# Patient Record
Sex: FEMALE | Race: WHITE | NOT HISPANIC OR LATINO | URBAN - METROPOLITAN AREA
[De-identification: names, ages, dates, MRNs, and addresses within clinical notes are randomized per-mention and may not be internally consistent; named-entity substitution may affect disease eponyms.]

---

## 2023-09-08 ENCOUNTER — TELEPHONE ENCOUNTER (OUTPATIENT)
Dept: URBAN - METROPOLITAN AREA CLINIC 74 | Facility: CLINIC | Age: 33
End: 2023-09-08

## 2023-09-08 ENCOUNTER — LAB OUTSIDE AN ENCOUNTER (OUTPATIENT)
Dept: URBAN - METROPOLITAN AREA CLINIC 74 | Facility: CLINIC | Age: 33
End: 2023-09-08

## 2023-09-08 ENCOUNTER — OFFICE VISIT (OUTPATIENT)
Dept: URBAN - METROPOLITAN AREA CLINIC 74 | Facility: CLINIC | Age: 33
End: 2023-09-08
Payer: MEDICAID

## 2023-09-08 ENCOUNTER — WEB ENCOUNTER (OUTPATIENT)
Dept: URBAN - METROPOLITAN AREA CLINIC 74 | Facility: CLINIC | Age: 33
End: 2023-09-08

## 2023-09-08 VITALS
TEMPERATURE: 97.7 F | DIASTOLIC BLOOD PRESSURE: 70 MMHG | HEART RATE: 92 BPM | BODY MASS INDEX: 51.91 KG/M2 | WEIGHT: 293 LBS | HEIGHT: 63 IN | SYSTOLIC BLOOD PRESSURE: 118 MMHG

## 2023-09-08 DIAGNOSIS — R10.824 LEFT LOWER QUADRANT ABDOMINAL TENDERNESS WITH REBOUND TENDERNESS: ICD-10-CM

## 2023-09-08 DIAGNOSIS — R19.7 DIARRHEA OF PRESUMED INFECTIOUS ORIGIN: ICD-10-CM

## 2023-09-08 DIAGNOSIS — K31.A0 GASTRIC INTESTINAL METAPLASIA: ICD-10-CM

## 2023-09-08 DIAGNOSIS — R11.2 NAUSEA AND VOMITING IN ADULT: ICD-10-CM

## 2023-09-08 DIAGNOSIS — R10.32 LLQ ABDOMINAL PAIN: ICD-10-CM

## 2023-09-08 DIAGNOSIS — K29.30 CHRONIC SUPERFICIAL GASTRITIS WITHOUT BLEEDING: ICD-10-CM

## 2023-09-08 DIAGNOSIS — K57.90 DIVERTICULOSIS: ICD-10-CM

## 2023-09-08 PROBLEM — 397881000: Status: ACTIVE | Noted: 2023-09-08

## 2023-09-08 PROCEDURE — 99204 OFFICE O/P NEW MOD 45 MIN: CPT | Performed by: PHYSICIAN ASSISTANT

## 2023-09-08 RX ORDER — PANTOPRAZOLE SODIUM 40 MG/1
1 TABLET TABLET, DELAYED RELEASE ORAL ONCE A DAY
Qty: 30 | Refills: 3 | OUTPATIENT
Start: 2023-09-08

## 2023-09-08 RX ORDER — ONDANSETRON 4 MG/1
1 TABLET ON THE TONGUE AND ALLOW TO DISSOLVE TABLET, ORALLY DISINTEGRATING ORAL
Qty: 30 | Refills: 0 | OUTPATIENT
Start: 2023-09-08

## 2023-09-08 RX ORDER — DICYCLOMINE HYDROCHLORIDE 20 MG/1
1 TABLET TABLET ORAL THREE TIMES A DAY
Qty: 90 | Refills: 3 | OUTPATIENT
Start: 2023-09-08 | End: 2024-01-05

## 2023-09-08 NOTE — PHYSICAL EXAM GASTROINTESTINAL
Abdomen , soft, LLQ abdominal tenderness, nondistended , no guarding or rigidity , no masses palpable , normal bowel sounds , Liver and Spleen , no hepatomegaly present , no hepatosplenomegaly , liver nontender , spleen not palpable

## 2023-09-08 NOTE — HPI-TODAY'S VISIT:
The patient is 33-year-old female with past medical history as noted below known to Dr. Wilson and Dr. Eisenberg is presenting to our clinic today with abdominal pain, diarrhea, nausea, and vomiting. The patient has had extended  GI work up in 2018 for the similar symptoms. The patient with three weeks history of nausea, vomiting, and epigastric abdominal pain with excessive gas. She laos has laternating diarrhea and constipation for over three weeks. She is on any meications currently and she has not had any labs, iamging, or any other tests done since she just got her insurance.   -- The patient denies dyspepsia, dysphagia, odynophagia, hemoptysis, hematemesis,  regurgitation, melena, hematochezia, fever, chills, chest pain, SOB, or any other GI complaints today. -- The patient denies ETOH, Tobacco, and Illicit drug use.  -- The patient is up to date with Flu, Pneumonia, Shingles, and COVID vaccine. -- Denies NSAID's.    Procedures: -- Colonoscopy with biopsy on 05/08/2018 by Dr. Wilson noted diverticulosis in the sigmoid colon.  Non bleeding internal hemorrhoids.  Normal mucosa entire examined colon.  The examined portion of the ileum was normal.  Biopsy with normal colonic mucosa with no evidence of active, chronic, or microscopic colitis.  -- EGD with biopsy on 05/08/2018 by Dr. Wilson noted normal examined duodenal.  Patchy mild inflammation characterized by congestion and erythema was found in the stomach.  The examined esophagus was normal.  Biopsy of duodenal with no significant histopathology.  No celiac sprue.  Stomach with chronic gastritis with focal intestinal metaplasia.  No H pylori organisms.  Esophagus with no significant abnormality.  No eosinophilic esophagitis or Hayes's esophagus.

## 2023-09-09 LAB
A/G RATIO: 1.5
ABSOLUTE BASOPHILS: 60
ABSOLUTE EOSINOPHILS: 95
ABSOLUTE LYMPHOCYTES: 2202
ABSOLUTE MONOCYTES: 678
ABSOLUTE NEUTROPHILS: 8866
ALBUMIN: 4.2
ALKALINE PHOSPHATASE: 66
ALT (SGPT): 12
AMYLASE: 30
AST (SGOT): 11
BASOPHILS: 0.5
BILIRUBIN, TOTAL: 0.4
BUN/CREATININE RATIO: (no result)
BUN: 11
CALCIUM: 9.1
CARBON DIOXIDE, TOTAL: 24
CHLORIDE: 104
CREATININE: 0.71
EGFR: 116
EOSINOPHILS: 0.8
GLOBULIN, TOTAL: 2.8
GLUCOSE: 76
HEMATOCRIT: 37.3
HEMOGLOBIN: 12.3
LIPASE: 24
LYMPHOCYTES: 18.5
MCH: 25.7
MCHC: 33
MCV: 77.9
MONOCYTES: 5.7
MPV: 10.7
NEUTROPHILS: 74.5
PLATELET COUNT: 247
POTASSIUM: 3.8
PROTEIN, TOTAL: 7
RDW: 14.5
RED BLOOD CELL COUNT: 4.79
SODIUM: 137
WHITE BLOOD CELL COUNT: 11.9

## 2023-09-11 ENCOUNTER — TELEPHONE ENCOUNTER (OUTPATIENT)
Dept: URBAN - METROPOLITAN AREA CLINIC 40 | Facility: CLINIC | Age: 33
End: 2023-09-11

## 2023-09-19 ENCOUNTER — OFFICE VISIT (OUTPATIENT)
Dept: URBAN - METROPOLITAN AREA MEDICAL CENTER 9 | Facility: MEDICAL CENTER | Age: 33
End: 2023-09-19
Payer: MEDICAID

## 2023-09-19 DIAGNOSIS — K30 ACID INDIGESTION: ICD-10-CM

## 2023-09-19 DIAGNOSIS — K29.50 ANTRAL GASTRITIS: ICD-10-CM

## 2023-09-19 PROCEDURE — 43239 EGD BIOPSY SINGLE/MULTIPLE: CPT | Performed by: INTERNAL MEDICINE

## 2023-09-22 ENCOUNTER — TELEPHONE ENCOUNTER (OUTPATIENT)
Dept: URBAN - METROPOLITAN AREA CLINIC 74 | Facility: CLINIC | Age: 33
End: 2023-09-22

## 2023-09-26 ENCOUNTER — OFFICE VISIT (OUTPATIENT)
Dept: URBAN - METROPOLITAN AREA CLINIC 74 | Facility: CLINIC | Age: 33
End: 2023-09-26

## 2023-09-26 NOTE — HPI-TODAY'S VISIT:
The patient is 33-year-old female with past medical history as noted below known to Dr. Wilson and Dr. Eisenberg is presenting to our clinic today to discuss her recent labs, imaging, and EGD results. She continues with Pantoprazole, Dicyclomine, and Zofran.   -- The patient denies dyspepsia, dysphagia, odynophagia, hemoptysis, hematemesis,  regurgitation, melena, hematochezia, fever, chills, chest pain, SOB, or any other GI complaints today. -- The patient denies ETOH, Tobacco, and Illicit drug use.  -- The patient is up to date with Flu, Pneumonia, Shingles, and COVID vaccine. -- Denies NSAID's.   Diagnsostic studies: -- CT scan on 09/08/2023 with hepatosplenomegaly. New small hypodense lesion within the right hepatic lobe too small to further characterize likely representing small cysts versus hemangioma but a new finding compared to the previous study, careful attention to this region on any subsequent follow-up imaging. Previous cholecystectomy. Diverticulosis with no evidence of acute diverticulitis.  -- Labs on 09/08/2023 CMP, Amylase, and Lipase are normal. CBC with WBC's 11.9.     Procedures: -- EGD with biopsy on 09/19/2023 by Dr. Eisneberg noted normal esophagus.  Chronic gastritis.  Normal examined duodenum.  Biopsy pending.  -- Colonoscopy with biopsy on 05/08/2018 by Dr. Wilson noted diverticulosis in the sigmoid colon.  Non bleeding internal hemorrhoids.  Normal mucosa entire examined colon.  The examined portion of the ileum was normal.  Biopsy with normal colonic mucosa with no evidence of active, chronic, or microscopic colitis.  -- EGD with biopsy on 05/08/2018 by Dr. Wilson noted normal examined duodenal.  Patchy mild inflammation characterized by congestion and erythema was found in the stomach.  The examined esophagus was normal.  Biopsy of duodenal with no significant histopathology.  No celiac sprue.  Stomach with chronic gastritis with focal intestinal metaplasia.  No H pylori organisms.  Esophagus with no significant abnormality.  No eosinophilic esophagitis or Hayes's esophagus.

## 2023-10-17 ENCOUNTER — OFFICE VISIT (OUTPATIENT)
Dept: URBAN - METROPOLITAN AREA TELEHEALTH 2 | Facility: TELEHEALTH | Age: 33
End: 2023-10-17

## 2023-10-17 RX ORDER — ONDANSETRON 4 MG/1
1 TABLET ON THE TONGUE AND ALLOW TO DISSOLVE TABLET, ORALLY DISINTEGRATING ORAL
Qty: 30 | Refills: 0 | Status: ACTIVE | COMMUNITY
Start: 2023-09-08

## 2023-10-17 RX ORDER — METRONIDAZOLE 500 MG/1
1 TABLET TABLET ORAL THREE TIMES A DAY
Qty: 30 | Refills: 0 | OUTPATIENT
Start: 2023-10-17 | End: 2023-10-27

## 2023-10-17 RX ORDER — PANTOPRAZOLE SODIUM 40 MG/1
1 TABLET TABLET, DELAYED RELEASE ORAL ONCE A DAY
Qty: 30 | Refills: 3 | Status: ACTIVE | COMMUNITY
Start: 2023-09-08

## 2023-10-17 RX ORDER — DICYCLOMINE HYDROCHLORIDE 20 MG/1
1 TABLET TABLET ORAL THREE TIMES A DAY
Qty: 90 | Refills: 3 | Status: ACTIVE | COMMUNITY
Start: 2023-09-08 | End: 2024-01-05

## 2023-10-17 NOTE — HPI-TODAY'S VISIT:
The patient is 33-year-old female with past medical history as noted below known to Dr. Wilson and Dr. Eisenberg is presenting to our clinic today to discuss her recent labs, imaging, and EGD results. She continues with Pantoprazole, Dicyclomine, and Zofran.  The patient called prior to her office appointment and requested to change her office visit to telemedicine since she was at the ED last night and she just left to go home early this afternoon. The patient was seen in ED on 10/17/2023 with complaint of abdominal pain.  Patient had CT, labs, and urine analysis with recommendation to follow with her primary GI physician.  The patient was diagnosed with acute diverticulitis and she was started on 10 days course of antibiotic Augmentin 1 tablet every 12 hours, Zofran 4 mg as needed for nausea, Norco 5/325 mg for pain, and probiotic. She ahs not started on her antibiotic yest. She si going to start later on this afternoon. No other new GI complaints.   -- The patient denies dyspepsia, dysphagia, odynophagia, hemoptysis, hematemesis,  regurgitation, melena, hematochezia, fever, chills, chest pain, SOB, or any other GI complaints today. -- The patient denies ETOH, Tobacco, and Illicit drug use.  -- The patient is up to date with Flu, Pneumonia, Shingles, and COVID vaccine. -- Denies NSAID's.   Diagnsostic studies: -- Labs on 10/17/2023 CBC with WBC 15.41, hemoglobin 11.5, hematocrit 36.7, and platelet 246.  BMP with BUN 13, creatinine 0.7, potassium 3.8, and sodium 136.  UA unremarkable.  Urine pregnancy test negative.  -- CT scan of the abdomen and pelvis consistent with acute diverticulitis of proximal sigmoid colon.  No evidence of perforation or abscess formation.  Hepatosplenomegaly.   Procedures: -- EGD with biopsy on 09/19/2023 by Dr. Eisenberg noted normal esophagus.  Chronic gastritis.  Normal examined duodenum.  Biopsy with chronic gastritis. No H. pylori organisms. No Intestinal metaplasia.   -- Colonoscopy with biopsy on 05/08/2018 by Dr. Wilson noted diverticulosis in the sigmoid colon.  Non bleeding internal hemorrhoids.  Normal mucosa entire examined colon.  The examined portion of the ileum was normal.  Biopsy with normal colonic mucosa with no evidence of active, chronic, or microscopic colitis.

## 2023-10-31 ENCOUNTER — OFFICE VISIT (OUTPATIENT)
Dept: URBAN - METROPOLITAN AREA CLINIC 74 | Facility: CLINIC | Age: 33
End: 2023-10-31

## 2023-11-17 ENCOUNTER — LAB OUTSIDE AN ENCOUNTER (OUTPATIENT)
Dept: URBAN - METROPOLITAN AREA CLINIC 74 | Facility: CLINIC | Age: 33
End: 2023-11-17

## 2023-11-17 ENCOUNTER — OFFICE VISIT (OUTPATIENT)
Dept: URBAN - METROPOLITAN AREA CLINIC 74 | Facility: CLINIC | Age: 33
End: 2023-11-17
Payer: MEDICAID

## 2023-11-17 VITALS
BODY MASS INDEX: 51.91 KG/M2 | TEMPERATURE: 97.7 F | SYSTOLIC BLOOD PRESSURE: 112 MMHG | WEIGHT: 293 LBS | HEIGHT: 63 IN | HEART RATE: 95 BPM | DIASTOLIC BLOOD PRESSURE: 98 MMHG

## 2023-11-17 DIAGNOSIS — R16.2 HEPATOSPLENOMEGALY: ICD-10-CM

## 2023-11-17 DIAGNOSIS — K29.30 CHRONIC SUPERFICIAL GASTRITIS WITHOUT BLEEDING: ICD-10-CM

## 2023-11-17 DIAGNOSIS — K57.92 DIVERTICULITIS: ICD-10-CM

## 2023-11-17 DIAGNOSIS — R74.01 TRANSAMINITIS: ICD-10-CM

## 2023-11-17 DIAGNOSIS — D18.03 LIVER HEMANGIOMA: ICD-10-CM

## 2023-11-17 PROCEDURE — 99214 OFFICE O/P EST MOD 30 MIN: CPT | Performed by: PHYSICIAN ASSISTANT

## 2023-11-17 RX ORDER — ONDANSETRON 4 MG/1
1 TABLET ON THE TONGUE AND ALLOW TO DISSOLVE TABLET, ORALLY DISINTEGRATING ORAL
Qty: 30 | Refills: 0 | Status: ACTIVE | COMMUNITY
Start: 2023-09-08

## 2023-11-17 RX ORDER — POLYETHYLENE GLYCOL 3350, SODIUM SULFATE, SODIUM CHLORIDE, POTASSIUM CHLORIDE, ASCORBIC ACID, SODIUM ASCORBATE 140-9-5.2G
AS DIRECTED KIT ORAL ONCE
Qty: 1 | Refills: 0 | OUTPATIENT
Start: 2023-11-17 | End: 2023-11-18

## 2023-11-17 RX ORDER — PANTOPRAZOLE SODIUM 40 MG/1
1 TABLET TABLET, DELAYED RELEASE ORAL ONCE A DAY
Qty: 90 TABLET | Refills: 1
Start: 2023-09-08

## 2023-11-17 RX ORDER — PANTOPRAZOLE SODIUM 40 MG/1
1 TABLET TABLET, DELAYED RELEASE ORAL ONCE A DAY
Qty: 30 | Refills: 3 | Status: ACTIVE | COMMUNITY
Start: 2023-09-08

## 2023-11-17 RX ORDER — DICYCLOMINE HYDROCHLORIDE 20 MG/1
1 TABLET TABLET ORAL THREE TIMES A DAY
Qty: 90 | Refills: 3 | Status: ACTIVE | COMMUNITY
Start: 2023-09-08 | End: 2024-01-05

## 2023-11-17 NOTE — HPI-TODAY'S VISIT:
The patient is 33-year-old female with past medical history as noted below known to Dr. Wilson and Dr. Eisenberg is presenting to our clinic today to schedule Colonoscopy. She  completed Agumentin 875 mg one tablet every 12 hours and Metronidazole 500 mg one tablet every 8 hours X 10 days. She was on pobiotic while taking antibiotic. She continues with Pantoprazole 40 mg once daily. The patient completed her antibiotic therapy and she is doing much better. No GI issues today.   -- The patient denies dyspepsia, dysphagia, odynophagia, hemoptysis, hematemesis,  regurgitation, melena, hematochezia, fever, chills, chest pain, SOB, or any other GI complaints today. -- The patient denies ETOH, Tobacco, and Illicit drug use.  -- The patient is up to date with Flu and COVID vaccine. -- Denies NSAID's.   Diagnsostic studies: -- Labs on 10/17/2023 CBC with WBC 15.41, hemoglobin 11.5, hematocrit 36.7, and platelet 246.  BMP with BUN 13, creatinine 0.7, potassium 3.8, and sodium 136.  UA unremarkable.  Urine pregnancy test negative.  -- CT scan of the abdomen and pelvis 10/17/2023 consistent with acute diverticulitis of proximal sigmoid colon.  No evidence of perforation or abscess formation.  Hepatosplenomegaly. Hypodense lesion measuring 5 mm right hepatic lobe too small to further characterize but statistically likely representing small cysts versus hemangiomas.  -- CT on 09/08/2023 with hepatosplenomegaly. New small hypodense lesion within the right hepatic lobe too small to further characterize likely representing small cysts versus hemangioma but a new finding compared to the previous study, careful attention to this region on any subsequent follow-up imaging. Previous cholecystectomy. Diverticulosis with no evidence of acute diverticulitis.  Procedures: -- EGD with biopsy on 09/19/2023 by Dr. Eisenberg noted normal esophagus.  Chronic gastritis.  Normal examined duodenum.  Biopsy with chronic gastritis. No H. pylori organisms. No Intestinal metaplasia.   -- Colonoscopy with biopsy on 05/08/2018 by Dr. Wilson noted diverticulosis in the sigmoid colon.  Non bleeding internal hemorrhoids.  Normal mucosa entire examined colon.  The examined portion of the ileum was normal.  Biopsy with normal colonic mucosa with no evidence of active, chronic, or microscopic colitis.

## 2023-11-27 NOTE — PHYSICAL EXAM HENT:
Head, normocephalic, atraumatic, Face, Face within normal limits, Ears, External ears within normal limits Mother

## 2023-11-28 ENCOUNTER — TELEPHONE ENCOUNTER (OUTPATIENT)
Dept: URBAN - METROPOLITAN AREA CLINIC 74 | Facility: CLINIC | Age: 33
End: 2023-11-28

## 2023-11-28 PROBLEM — 35400008: Status: ACTIVE | Noted: 2023-11-28

## 2023-11-28 LAB
% SATURATION: 10
A/G RATIO: 1.4
ABSOLUTE BASOPHILS: 35
ABSOLUTE EOSINOPHILS: 105
ABSOLUTE LYMPHOCYTES: 2165
ABSOLUTE MONOCYTES: 585
ABSOLUTE NEUTROPHILS: 8810
ACTIN (SMOOTH MUSCLE) ANTIBODY (IGG): <20
ALBUMIN: 4.2
ALKALINE PHOSPHATASE: 75
ALPHA-1-ANTITRYPSIN QN: 158
ALT (SGPT): 10
ANA SCREEN, IFA: NEGATIVE
AST (SGOT): 12
BASOPHILS: 0.3
BILIRUBIN, TOTAL: 0.4
BUN/CREATININE RATIO: (no result)
BUN: 11
CALCIUM: 8.9
CARBON DIOXIDE, TOTAL: 23
CHLORIDE: 103
CREATININE: 0.68
EGFR: 119
EOSINOPHILS: 0.9
FERRITIN: 14
GGT: 18
GLOBULIN, TOTAL: 2.9
GLUCOSE: 109
HBSAG SCREEN: (no result)
HEMATOCRIT: 37.9
HEMOGLOBIN: 12.2
HEP A AB, IGM: (no result)
HEP B CORE AB, IGM: (no result)
HEPATITIS C ANTIBODY: (no result)
HEREDITARY HEMOCHROMATOSIS DNA MUT: (no result)
IRON BINDING CAPACITY: 434
IRON, TOTAL: 43
LYMPHOCYTES: 18.5
MCH: 24.9
MCHC: 32.2
MCV: 77.3
MITOCHONDRIAL (M2) ANTIBODY: <=20
MONOCYTES: 5
MPV: 10.8
NEUTROPHILS: 75.3
PLATELET COUNT: 293
POTASSIUM: 3.8
PROTEIN, TOTAL: 7.1
RDW: 14.4
RED BLOOD CELL COUNT: 4.9
RHEUMATOID FACTOR: <14
SJOGREN'S ANTIBODY (SS-A): (no result)
SJOGREN'S ANTIBODY (SS-B): (no result)
SODIUM: 137
WHITE BLOOD CELL COUNT: 11.7

## 2023-12-13 ENCOUNTER — TELEPHONE ENCOUNTER (OUTPATIENT)
Dept: URBAN - METROPOLITAN AREA CLINIC 40 | Facility: CLINIC | Age: 33
End: 2023-12-13

## 2023-12-21 ENCOUNTER — TELEPHONE ENCOUNTER (OUTPATIENT)
Dept: URBAN - METROPOLITAN AREA CLINIC 40 | Facility: CLINIC | Age: 33
End: 2023-12-21

## 2024-01-02 ENCOUNTER — OFFICE VISIT (OUTPATIENT)
Dept: URBAN - METROPOLITAN AREA MEDICAL CENTER 9 | Facility: MEDICAL CENTER | Age: 34
End: 2024-01-02

## 2024-01-03 ENCOUNTER — OFFICE VISIT (OUTPATIENT)
Dept: URBAN - METROPOLITAN AREA MEDICAL CENTER 30 | Facility: MEDICAL CENTER | Age: 34
End: 2024-01-03

## 2024-01-05 ENCOUNTER — TELEPHONE ENCOUNTER (OUTPATIENT)
Dept: URBAN - METROPOLITAN AREA CLINIC 74 | Facility: CLINIC | Age: 34
End: 2024-01-05

## 2024-01-16 ENCOUNTER — OFFICE VISIT (OUTPATIENT)
Dept: URBAN - METROPOLITAN AREA MEDICAL CENTER 9 | Facility: MEDICAL CENTER | Age: 34
End: 2024-01-16

## 2024-01-29 ENCOUNTER — OFFICE VISIT (OUTPATIENT)
Dept: URBAN - METROPOLITAN AREA CLINIC 74 | Facility: CLINIC | Age: 34
End: 2024-01-29

## 2024-01-29 NOTE — HPI-TODAY'S VISIT:
The patient is 33-year-old female with past medical history as noted below known to Dr. Wilson and Dr. Eisenberg is presenting to our clinic today to discuss her recent labs, imaging, and EGD results. She continues with Pantoprazole, Dicyclomine, and Zofran.   -- The patient denies dyspepsia, dysphagia, odynophagia, hemoptysis, hematemesis,  regurgitation, melena, hematochezia, fever, chills, chest pain, SOB, or any other GI complaints today. -- The patient denies ETOH, Tobacco, and Illicit drug use.  -- The patient is up to date with Flu, Pneumonia, Shingles, and COVID vaccine. -- Denies NSAID's.   Diagnsostic studies: -- CT scan on 09/08/2023 with hepatosplenomegaly. New small hypodense lesion within the right hepatic lobe too small to further characterize likely representing small cysts versus hemangioma but a new finding compared to the previous study, careful attention to this region on any subsequent follow-up imaging. Previous cholecystectomy. Diverticulosis with no evidence of acute diverticulitis.  -- Labs on 09/08/2023 CMP, Amylase, and Lipase are normal. CBC with WBC's 11.9.    Procedures: -- EGD with biopsy on 09/19/2023 by Dr. Eisenberg noted normal esophagus.  Chronic gastritis.  Normal examined duodenum.  Biopsy of antrum with mild chronic gastritis.  Negative for H. pylori organisms and intestinal metaplasia.    -- Colonoscopy with biopsy on 05/08/2018 by Dr. Wilson noted diverticulosis in the sigmoid colon.  Non bleeding internal hemorrhoids.  Normal mucosa entire examined colon.  The examined portion of the ileum was normal.  Biopsy with normal colonic mucosa with no evidence of active, chronic, or microscopic colitis.

## 2024-01-30 ENCOUNTER — OFFICE VISIT (OUTPATIENT)
Dept: URBAN - METROPOLITAN AREA CLINIC 74 | Facility: CLINIC | Age: 34
End: 2024-01-30

## 2024-01-30 RX ORDER — PANTOPRAZOLE SODIUM 40 MG/1
1 TABLET TABLET, DELAYED RELEASE ORAL ONCE A DAY
Qty: 90 TABLET | Refills: 1 | Status: ACTIVE | COMMUNITY
Start: 2023-09-08

## 2024-01-30 RX ORDER — ONDANSETRON 4 MG/1
1 TABLET ON THE TONGUE AND ALLOW TO DISSOLVE TABLET, ORALLY DISINTEGRATING ORAL
Qty: 30 | Refills: 0 | Status: ACTIVE | COMMUNITY
Start: 2023-09-08

## 2024-01-30 NOTE — HPI-TODAY'S VISIT:
The patient is 33-year-old female with past medical history as noted below known to Dr. Wilson and Dr. Eisenberg is presenting to our clinic today to discuss her recent labs and Colonoscopy results. She continues with Pantoprazole, Dicyclomine, and Zofran.   -- The patient denies dyspepsia, dysphagia, odynophagia, hemoptysis, hematemesis,  regurgitation, melena, hematochezia, fever, chills, chest pain, SOB, or any other GI complaints today. -- The patient denies ETOH, Tobacco, and Illicit drug use.  -- The patient is up to date with Flu, Pneumonia, Shingles, and COVID vaccine. -- Denies NSAID's.   Diagnsostic studies: -- Labs on 11/17/2023 GGT, Alpha 1 Antitrypsin, ASMA, AMA, JEANNIE,CMP, CBC with WBC's 11.7, Acute Hepatitis Panel, Fe 43, TIBC 434, % Sat. 10, Ferritin 14, and HHMA with one single are over all normal. mutation [id number].    -- CT scan on 09/08/2023 with hepatosplenomegaly. New small hypodense lesion within the right hepatic lobe too small to further characterize likely representing small cysts versus hemangioma but a new finding compared to the previous study, careful attention to this region on any subsequent follow-up imaging. Previous cholecystectomy. Diverticulosis with no evidence of acute diverticulitis.  -- Labs on 09/08/2023 CMP, Amylase, and Lipase are normal. CBC with WBC's 11.9.    Procedures: -- EGD with biopsy on 09/19/2023 by Dr. Eisenberg noted normal esophagus.  Chronic gastritis.  Normal examined duodenum.  Biopsy of antrum with mild chronic gastritis.  Negative for H. pylori organisms and intestinal metaplasia.    -- Colonoscopy with biopsy on 05/08/2018 by Dr. Wilson noted diverticulosis in the sigmoid colon.  Non bleeding internal hemorrhoids.  Normal mucosa entire examined colon.  The examined portion of the ileum was normal.  Biopsy with normal colonic mucosa with no evidence of active, chronic, or microscopic colitis.

## 2024-02-12 ENCOUNTER — OV EP (OUTPATIENT)
Dept: URBAN - METROPOLITAN AREA CLINIC 74 | Facility: CLINIC | Age: 34
End: 2024-02-12